# Patient Record
Sex: FEMALE | Race: WHITE
[De-identification: names, ages, dates, MRNs, and addresses within clinical notes are randomized per-mention and may not be internally consistent; named-entity substitution may affect disease eponyms.]

---

## 2020-10-07 ENCOUNTER — HOSPITAL ENCOUNTER (INPATIENT)
Dept: HOSPITAL 19 - LDRO | Age: 25
LOS: 1 days | Discharge: HOME | End: 2020-10-08
Attending: OBSTETRICS & GYNECOLOGY | Admitting: OBSTETRICS & GYNECOLOGY
Payer: COMMERCIAL

## 2020-10-07 VITALS — DIASTOLIC BLOOD PRESSURE: 80 MMHG | SYSTOLIC BLOOD PRESSURE: 124 MMHG | HEART RATE: 95 BPM | TEMPERATURE: 98.3 F

## 2020-10-07 VITALS — HEART RATE: 76 BPM | SYSTOLIC BLOOD PRESSURE: 120 MMHG | DIASTOLIC BLOOD PRESSURE: 68 MMHG

## 2020-10-07 VITALS — HEART RATE: 83 BPM | SYSTOLIC BLOOD PRESSURE: 105 MMHG | DIASTOLIC BLOOD PRESSURE: 67 MMHG | TEMPERATURE: 98 F

## 2020-10-07 VITALS — DIASTOLIC BLOOD PRESSURE: 73 MMHG | HEART RATE: 106 BPM | SYSTOLIC BLOOD PRESSURE: 115 MMHG

## 2020-10-07 VITALS — SYSTOLIC BLOOD PRESSURE: 110 MMHG | HEART RATE: 100 BPM | DIASTOLIC BLOOD PRESSURE: 71 MMHG

## 2020-10-07 VITALS — HEART RATE: 98 BPM | SYSTOLIC BLOOD PRESSURE: 122 MMHG | DIASTOLIC BLOOD PRESSURE: 57 MMHG

## 2020-10-07 VITALS — SYSTOLIC BLOOD PRESSURE: 114 MMHG | DIASTOLIC BLOOD PRESSURE: 73 MMHG | HEART RATE: 97 BPM

## 2020-10-07 VITALS — SYSTOLIC BLOOD PRESSURE: 126 MMHG | HEART RATE: 108 BPM | DIASTOLIC BLOOD PRESSURE: 60 MMHG

## 2020-10-07 VITALS — WEIGHT: 159.39 LBS | HEIGHT: 62.99 IN | BODY MASS INDEX: 28.24 KG/M2

## 2020-10-07 VITALS — HEART RATE: 93 BPM | DIASTOLIC BLOOD PRESSURE: 49 MMHG | TEMPERATURE: 98.2 F | SYSTOLIC BLOOD PRESSURE: 112 MMHG

## 2020-10-07 VITALS — SYSTOLIC BLOOD PRESSURE: 113 MMHG | DIASTOLIC BLOOD PRESSURE: 64 MMHG | HEART RATE: 108 BPM

## 2020-10-07 VITALS — HEART RATE: 72 BPM | TEMPERATURE: 98.4 F | DIASTOLIC BLOOD PRESSURE: 72 MMHG | SYSTOLIC BLOOD PRESSURE: 116 MMHG

## 2020-10-07 VITALS — HEART RATE: 106 BPM | SYSTOLIC BLOOD PRESSURE: 108 MMHG | DIASTOLIC BLOOD PRESSURE: 58 MMHG

## 2020-10-07 VITALS — SYSTOLIC BLOOD PRESSURE: 120 MMHG | DIASTOLIC BLOOD PRESSURE: 76 MMHG | HEART RATE: 114 BPM

## 2020-10-07 VITALS — SYSTOLIC BLOOD PRESSURE: 121 MMHG | DIASTOLIC BLOOD PRESSURE: 70 MMHG | HEART RATE: 102 BPM

## 2020-10-07 VITALS — DIASTOLIC BLOOD PRESSURE: 73 MMHG | HEART RATE: 95 BPM | SYSTOLIC BLOOD PRESSURE: 111 MMHG

## 2020-10-07 VITALS — SYSTOLIC BLOOD PRESSURE: 146 MMHG | HEART RATE: 123 BPM | DIASTOLIC BLOOD PRESSURE: 78 MMHG

## 2020-10-07 VITALS — HEART RATE: 113 BPM | SYSTOLIC BLOOD PRESSURE: 130 MMHG | DIASTOLIC BLOOD PRESSURE: 62 MMHG

## 2020-10-07 VITALS — HEART RATE: 89 BPM | DIASTOLIC BLOOD PRESSURE: 65 MMHG | SYSTOLIC BLOOD PRESSURE: 107 MMHG

## 2020-10-07 VITALS — DIASTOLIC BLOOD PRESSURE: 69 MMHG | HEART RATE: 106 BPM | SYSTOLIC BLOOD PRESSURE: 124 MMHG

## 2020-10-07 DIAGNOSIS — Z3A.40: ICD-10-CM

## 2020-10-07 LAB
BASOPHILS # BLD: 0 10*3/UL (ref 0–0.2)
BASOPHILS NFR BLD AUTO: 0.4 % (ref 0–2)
EOSINOPHIL # BLD: 0.1 10*3/UL (ref 0–0.7)
EOSINOPHIL NFR BLD: 0.5 % (ref 0–4)
ERYTHROCYTE [DISTWIDTH] IN BLOOD BY AUTOMATED COUNT: 11.7 % (ref 11.5–14.5)
GRANULOCYTES # BLD AUTO: 70.5 % (ref 42.2–75.2)
HCT VFR BLD AUTO: 39.2 % (ref 37–47)
HGB BLD-MCNC: 13.7 G/DL (ref 12.5–16)
LYMPHOCYTES # BLD: 2.2 10*3/UL (ref 1.2–3.4)
LYMPHOCYTES NFR BLD: 20.7 % (ref 20–51)
MCH RBC QN AUTO: 33 PG (ref 27–31)
MCHC RBC AUTO-ENTMCNC: 35 G/DL (ref 33–37)
MCV RBC AUTO: 94 FL (ref 80–100)
MONOCYTES # BLD: 0.8 10*3/UL (ref 0.1–0.6)
MONOCYTES NFR BLD AUTO: 7.4 % (ref 1.7–9.3)
NEUTROPHILS # BLD: 7.6 10*3/UL (ref 1.4–6.5)
PLATELET # BLD AUTO: 226 K/MM3 (ref 130–400)
PMV BLD AUTO: 10.7 FL (ref 7.4–10.4)
RBC # BLD AUTO: 4.18 M/MM3 (ref 4.1–5.3)

## 2020-10-07 PROCEDURE — 0KQM0ZZ REPAIR PERINEUM MUSCLE, OPEN APPROACH: ICD-10-PCS | Performed by: OBSTETRICS & GYNECOLOGY

## 2020-10-07 NOTE — NUR
Report recieved at 1845. Pt denied bedside shift report. Informed pt was going
to attempt to breastfeed during report. At 1900 introduced self to pt and
updated whiteboard. Reports going to attempt to breastfeed. VS obtaind and
assessment completed. Motrin administered per request. Denied further
questions or concerns.

## 2020-10-07 NOTE — NUR
0140 YUSUF Yancey to room to place epidural. Patient sits upright on the edge
of the bed. FHR difficult to monitor in this position and intermittently
traces maternal HR as it coorelates with maternal spO2 tracing.
0151 Single shot by YUSUF Yancey. See anesthesia record for details.

## 2020-10-07 NOTE — NUR
G1 at 40 weeks gestation to LDR5 with c/o contractions and SROM at 2230.
Patient changed into gown and wedged to left side in bed. EFMs explained and
applied.  bpm and reactive. CTX q3-5 minutes apart, patient breathing
through them. VSS. SVE 5/90/-2 with moderate amount of clear fluid noted.
Assessment completed. Plan of care reviewed with patient and spouse.

## 2020-10-07 NOTE — NUR
0325 Patient pushing with contractions. 0335 Dr. Adan on unit. Reviews FHR
tracing and pushing efforts, remains on unit. Patient continues to push with
contractions. 0350 Dr. Adan to room for delivery, patient prepped. 0359
Spontaneous vaginal delivery of viable female infant by Dr. Adan. Cord clamped
and cut and infant to the care of the nursery RN. 0407 Spontaneous delivery of
placenta by Dr. Adan. Pitocin infusing at 333ml/hr per orders and protocol.
Fundus firm, lochia WNL. Repair of 2nd degree perineal laceration and
bilateral labial lacerations by Dr. Adan.

## 2020-10-07 NOTE — NUR
Recurrent late FHR decelerations continue. 0305 SVE - complete/+2. Patient
pushes with contraction, after pushing FHR down to 60 bpm for 3 minutes with a
gradual return to baseline over the next 2 minutes. 0310 Dr. Adan called and
updated. He is on his way to the hospital.

## 2020-10-08 VITALS — SYSTOLIC BLOOD PRESSURE: 118 MMHG | HEART RATE: 91 BPM | DIASTOLIC BLOOD PRESSURE: 73 MMHG | TEMPERATURE: 98.1 F

## 2020-10-08 VITALS — DIASTOLIC BLOOD PRESSURE: 77 MMHG | SYSTOLIC BLOOD PRESSURE: 128 MMHG | TEMPERATURE: 98.4 F | HEART RATE: 85 BPM

## 2020-10-08 NOTE — NUR
Initial visit; Parents thanked  for offering congratulations and God's
blessings for the birth of their daughter.  thanked family for
choosing [facility].

## 2022-04-03 ENCOUNTER — HOSPITAL ENCOUNTER (INPATIENT)
Dept: HOSPITAL 19 - LDRO | Age: 27
LOS: 1 days | Discharge: HOME | End: 2022-04-04
Attending: OBSTETRICS & GYNECOLOGY | Admitting: OBSTETRICS & GYNECOLOGY
Payer: COMMERCIAL

## 2022-04-03 VITALS — DIASTOLIC BLOOD PRESSURE: 55 MMHG | HEART RATE: 100 BPM | SYSTOLIC BLOOD PRESSURE: 117 MMHG

## 2022-04-03 VITALS — HEART RATE: 68 BPM | SYSTOLIC BLOOD PRESSURE: 124 MMHG | TEMPERATURE: 98.4 F | DIASTOLIC BLOOD PRESSURE: 68 MMHG

## 2022-04-03 VITALS — DIASTOLIC BLOOD PRESSURE: 70 MMHG | HEART RATE: 84 BPM | SYSTOLIC BLOOD PRESSURE: 108 MMHG | TEMPERATURE: 98.2 F

## 2022-04-03 VITALS — DIASTOLIC BLOOD PRESSURE: 68 MMHG | HEART RATE: 72 BPM | SYSTOLIC BLOOD PRESSURE: 110 MMHG

## 2022-04-03 VITALS — SYSTOLIC BLOOD PRESSURE: 111 MMHG | TEMPERATURE: 98.1 F | DIASTOLIC BLOOD PRESSURE: 76 MMHG | HEART RATE: 102 BPM

## 2022-04-03 VITALS — DIASTOLIC BLOOD PRESSURE: 59 MMHG | HEART RATE: 96 BPM | SYSTOLIC BLOOD PRESSURE: 120 MMHG

## 2022-04-03 VITALS — HEART RATE: 76 BPM | DIASTOLIC BLOOD PRESSURE: 72 MMHG | SYSTOLIC BLOOD PRESSURE: 114 MMHG

## 2022-04-03 VITALS — DIASTOLIC BLOOD PRESSURE: 62 MMHG | HEART RATE: 92 BPM | TEMPERATURE: 97.7 F | SYSTOLIC BLOOD PRESSURE: 108 MMHG

## 2022-04-03 VITALS — SYSTOLIC BLOOD PRESSURE: 114 MMHG | DIASTOLIC BLOOD PRESSURE: 68 MMHG | HEART RATE: 64 BPM

## 2022-04-03 VITALS — DIASTOLIC BLOOD PRESSURE: 79 MMHG | SYSTOLIC BLOOD PRESSURE: 116 MMHG | HEART RATE: 78 BPM

## 2022-04-03 VITALS — DIASTOLIC BLOOD PRESSURE: 68 MMHG | HEART RATE: 66 BPM | SYSTOLIC BLOOD PRESSURE: 116 MMHG

## 2022-04-03 VITALS — SYSTOLIC BLOOD PRESSURE: 112 MMHG | HEART RATE: 75 BPM | DIASTOLIC BLOOD PRESSURE: 68 MMHG

## 2022-04-03 VITALS — SYSTOLIC BLOOD PRESSURE: 106 MMHG | HEART RATE: 90 BPM | DIASTOLIC BLOOD PRESSURE: 51 MMHG

## 2022-04-03 VITALS — WEIGHT: 167.33 LBS | BODY MASS INDEX: 29.65 KG/M2 | HEIGHT: 62.99 IN

## 2022-04-03 DIAGNOSIS — Z3A.39: ICD-10-CM

## 2022-04-03 LAB
BASOPHILS # BLD: 0.1 K/MM3 (ref 0–0.2)
BASOPHILS NFR BLD AUTO: 0.4 % (ref 0–2)
EOSINOPHIL # BLD: 0 K/MM3 (ref 0–0.7)
EOSINOPHIL NFR BLD: 0.3 % (ref 0–4)
ERYTHROCYTE [DISTWIDTH] IN BLOOD BY AUTOMATED COUNT: 11.9 % (ref 11.5–14.5)
GRANULOCYTES # BLD AUTO: 81.9 % (ref 42.2–75.2)
HCT VFR BLD AUTO: 37.3 % (ref 37–47)
HGB BLD-MCNC: 12.9 G/DL (ref 12.5–16)
LYMPHOCYTES # BLD: 1.5 K/MM3 (ref 1.2–3.4)
LYMPHOCYTES NFR BLD: 12.5 % (ref 20–51)
MCH RBC QN AUTO: 31 PG (ref 27–31)
MCHC RBC AUTO-ENTMCNC: 35 G/DL (ref 33–37)
MCV RBC AUTO: 89 FL (ref 80–100)
MONOCYTES # BLD: 0.6 K/MM3 (ref 0.1–0.6)
MONOCYTES NFR BLD AUTO: 4.6 % (ref 1.7–9.3)
NEUTROPHILS # BLD: 9.9 K/MM3 (ref 1.4–6.5)
PLATELET # BLD AUTO: 215 K/MM3 (ref 130–400)
PMV BLD AUTO: 10.1 FL (ref 7.4–10.4)
RBC # BLD AUTO: 4.19 M/MM3 (ref 4.1–5.3)

## 2022-04-03 NOTE — NUR
1125 PEANUT BALL  PLACED PATIENT LL AT THIS TIME.
1130 SVE COMPLETE/100/+2 DR RUBIO CALLED TO ROOM AT THIS TIME FOR DELIVERY.
PATIENT FEELING TONS OF PRESSURE.  WITH VARIABLES NOTED WITH EACH
CONTRACTION.
1132 DR RUBIO AT BEDSIDE. PREP PATIENT FOR DELIVERY. WILL PUSH WITH EACH
CONTRACTION.
1135 BABY BOY DELIVERED  WITH HAND BY HEAD AND NUCHAL TIMES ONE THAT WAS
CLAMPED AND CUT BY DR RUBIO. BABY TO MOM CHEST SKIN TO SKIN. STRONG CRY NOTED.
1137 PLACENTA DELIVERED AT THIS TIME AND PITOCIN STARTED  PER PROTOCOL.
FUNDUS FIRM AND BLEEDING WNL.  NO REPAIR DONE AT THS TIME.  BABY REMAINS SKIN
TO SKIN AT THIS TIEM.

## 2022-04-03 NOTE — NUR
1040 PATIENT HERE FOR COMPLAINTS THAT CONTRACTIONS HAVE GOT MUCH MORE INTENSE
AND FEELING PRESSURE ALSO. SVE 7/100/0 BULGY BAG NOTED. EFM ON  BABY
VERY ACTIVE AND ACCELERATIONS NOTED. DR RUBIO UPDATED AND ORDERS GIVEN.

## 2022-04-03 NOTE — NUR
1110 DR RUBIO AT BEDSIDE AROM WITH AMNIOHOOK. MECONIUM FLUID NOTED AT THIS
TIME. CONDE PLACED BY THIS NURSE.

## 2022-04-03 NOTE — NUR
1500 REPORT GIVEN TO DEANNA VASQUEZ LPN TO ASSUME CARE AT THIS TIME. LEGS REMIAN
NUMB AT THIS TIME FROM EPIDURAL SO UNABLE TO MOVE TO ROOM AT THIS TIME

## 2022-04-04 VITALS — TEMPERATURE: 97.7 F | HEART RATE: 76 BPM | SYSTOLIC BLOOD PRESSURE: 100 MMHG | DIASTOLIC BLOOD PRESSURE: 72 MMHG

## 2022-04-04 VITALS — TEMPERATURE: 98.1 F | SYSTOLIC BLOOD PRESSURE: 88 MMHG | HEART RATE: 76 BPM | DIASTOLIC BLOOD PRESSURE: 52 MMHG

## 2024-10-07 ENCOUNTER — HOSPITAL ENCOUNTER (INPATIENT)
Dept: HOSPITAL 19 - LDR | Age: 29
LOS: 1 days | Discharge: HOME | End: 2024-10-08
Attending: OBSTETRICS & GYNECOLOGY | Admitting: OBSTETRICS & GYNECOLOGY
Payer: COMMERCIAL

## 2024-10-07 VITALS — DIASTOLIC BLOOD PRESSURE: 70 MMHG | HEART RATE: 82 BPM | SYSTOLIC BLOOD PRESSURE: 114 MMHG

## 2024-10-07 VITALS — SYSTOLIC BLOOD PRESSURE: 1223 MMHG | DIASTOLIC BLOOD PRESSURE: 66 MMHG | HEART RATE: 85 BPM

## 2024-10-07 VITALS — DIASTOLIC BLOOD PRESSURE: 62 MMHG | SYSTOLIC BLOOD PRESSURE: 107 MMHG | HEART RATE: 85 BPM

## 2024-10-07 VITALS — SYSTOLIC BLOOD PRESSURE: 116 MMHG | HEART RATE: 74 BPM | DIASTOLIC BLOOD PRESSURE: 63 MMHG

## 2024-10-07 VITALS — DIASTOLIC BLOOD PRESSURE: 70 MMHG | SYSTOLIC BLOOD PRESSURE: 103 MMHG | HEART RATE: 85 BPM

## 2024-10-07 VITALS — DIASTOLIC BLOOD PRESSURE: 64 MMHG | SYSTOLIC BLOOD PRESSURE: 116 MMHG | HEART RATE: 85 BPM

## 2024-10-07 VITALS — DIASTOLIC BLOOD PRESSURE: 69 MMHG | SYSTOLIC BLOOD PRESSURE: 108 MMHG | HEART RATE: 85 BPM | TEMPERATURE: 98.2 F

## 2024-10-07 VITALS — HEART RATE: 82 BPM | SYSTOLIC BLOOD PRESSURE: 112 MMHG | DIASTOLIC BLOOD PRESSURE: 67 MMHG

## 2024-10-07 VITALS — HEART RATE: 80 BPM | DIASTOLIC BLOOD PRESSURE: 60 MMHG | SYSTOLIC BLOOD PRESSURE: 105 MMHG

## 2024-10-07 VITALS — DIASTOLIC BLOOD PRESSURE: 58 MMHG | TEMPERATURE: 98.7 F | HEART RATE: 66 BPM | SYSTOLIC BLOOD PRESSURE: 106 MMHG

## 2024-10-07 VITALS — SYSTOLIC BLOOD PRESSURE: 111 MMHG | HEART RATE: 77 BPM | DIASTOLIC BLOOD PRESSURE: 54 MMHG | TEMPERATURE: 98.1 F

## 2024-10-07 VITALS — SYSTOLIC BLOOD PRESSURE: 113 MMHG | HEART RATE: 96 BPM | DIASTOLIC BLOOD PRESSURE: 75 MMHG

## 2024-10-07 VITALS — HEART RATE: 86 BPM | SYSTOLIC BLOOD PRESSURE: 105 MMHG | DIASTOLIC BLOOD PRESSURE: 71 MMHG

## 2024-10-07 VITALS — DIASTOLIC BLOOD PRESSURE: 70 MMHG | HEART RATE: 83 BPM | SYSTOLIC BLOOD PRESSURE: 105 MMHG

## 2024-10-07 VITALS — SYSTOLIC BLOOD PRESSURE: 102 MMHG | DIASTOLIC BLOOD PRESSURE: 60 MMHG | HEART RATE: 82 BPM

## 2024-10-07 VITALS — DIASTOLIC BLOOD PRESSURE: 75 MMHG | HEART RATE: 94 BPM | SYSTOLIC BLOOD PRESSURE: 112 MMHG | TEMPERATURE: 98 F

## 2024-10-07 VITALS — SYSTOLIC BLOOD PRESSURE: 113 MMHG | DIASTOLIC BLOOD PRESSURE: 66 MMHG | HEART RATE: 82 BPM

## 2024-10-07 VITALS — SYSTOLIC BLOOD PRESSURE: 127 MMHG | HEART RATE: 87 BPM | DIASTOLIC BLOOD PRESSURE: 79 MMHG

## 2024-10-07 VITALS — TEMPERATURE: 98.2 F | SYSTOLIC BLOOD PRESSURE: 106 MMHG | DIASTOLIC BLOOD PRESSURE: 72 MMHG | HEART RATE: 103 BPM

## 2024-10-07 VITALS — HEART RATE: 83 BPM | DIASTOLIC BLOOD PRESSURE: 74 MMHG | SYSTOLIC BLOOD PRESSURE: 106 MMHG

## 2024-10-07 VITALS — SYSTOLIC BLOOD PRESSURE: 108 MMHG | DIASTOLIC BLOOD PRESSURE: 56 MMHG | HEART RATE: 69 BPM

## 2024-10-07 VITALS — HEART RATE: 75 BPM | DIASTOLIC BLOOD PRESSURE: 89 MMHG | SYSTOLIC BLOOD PRESSURE: 107 MMHG

## 2024-10-07 VITALS — HEART RATE: 97 BPM | DIASTOLIC BLOOD PRESSURE: 75 MMHG | SYSTOLIC BLOOD PRESSURE: 115 MMHG

## 2024-10-07 VITALS — SYSTOLIC BLOOD PRESSURE: 108 MMHG | DIASTOLIC BLOOD PRESSURE: 60 MMHG | HEART RATE: 71 BPM

## 2024-10-07 VITALS — HEART RATE: 79 BPM | DIASTOLIC BLOOD PRESSURE: 64 MMHG | SYSTOLIC BLOOD PRESSURE: 104 MMHG

## 2024-10-07 VITALS — HEART RATE: 61 BPM | SYSTOLIC BLOOD PRESSURE: 101 MMHG | DIASTOLIC BLOOD PRESSURE: 65 MMHG

## 2024-10-07 VITALS — BODY MASS INDEX: 30.35 KG/M2 | WEIGHT: 171.3 LBS | HEIGHT: 62.99 IN

## 2024-10-07 VITALS — HEART RATE: 94 BPM | SYSTOLIC BLOOD PRESSURE: 111 MMHG | DIASTOLIC BLOOD PRESSURE: 51 MMHG

## 2024-10-07 VITALS — HEART RATE: 87 BPM | DIASTOLIC BLOOD PRESSURE: 69 MMHG | SYSTOLIC BLOOD PRESSURE: 109 MMHG

## 2024-10-07 VITALS — HEART RATE: 100 BPM | DIASTOLIC BLOOD PRESSURE: 70 MMHG | SYSTOLIC BLOOD PRESSURE: 114 MMHG

## 2024-10-07 VITALS — SYSTOLIC BLOOD PRESSURE: 109 MMHG | DIASTOLIC BLOOD PRESSURE: 67 MMHG | HEART RATE: 78 BPM

## 2024-10-07 DIAGNOSIS — O36.5930: ICD-10-CM

## 2024-10-07 DIAGNOSIS — Z3A.38: ICD-10-CM

## 2024-10-07 LAB
BASOPHILS # BLD: 0 K/MM3 (ref 0–0.2)
BASOPHILS NFR BLD AUTO: 0.6 % (ref 0–2)
EOSINOPHIL # BLD: 0.1 K/MM3 (ref 0–0.7)
EOSINOPHIL NFR BLD: 1.1 % (ref 0–4)
ERYTHROCYTE [DISTWIDTH] IN BLOOD BY AUTOMATED COUNT: 12.3 % (ref 11.5–14.5)
GRANULOCYTES # BLD AUTO: 73.6 % (ref 42.2–75.2)
HCT VFR BLD AUTO: 35.5 % (ref 37–47)
HGB BLD-MCNC: 12.3 G/DL (ref 12.5–16)
LYMPHOCYTES # BLD: 1.2 K/MM3 (ref 1.2–3.4)
LYMPHOCYTES NFR BLD: 17.4 % (ref 20–51)
MCH RBC QN AUTO: 32 PG (ref 27–31)
MCHC RBC AUTO-ENTMCNC: 35 G/DL (ref 33–37)
MCV RBC AUTO: 92 FL (ref 80–100)
MONOCYTES # BLD: 0.5 K/MM3 (ref 0.1–0.6)
MONOCYTES NFR BLD AUTO: 6.7 % (ref 1.7–9.3)
NEUTROPHILS # BLD: 5.2 K/MM3 (ref 1.4–6.5)
PLATELET # BLD AUTO: 179 K/MM3 (ref 130–400)
PMV BLD AUTO: 9.8 FL (ref 7.4–10.4)
RBC # BLD AUTO: 3.86 M/MM3 (ref 4.1–5.3)

## 2024-10-07 PROCEDURE — 3E033VJ INTRODUCTION OF OTHER HORMONE INTO PERIPHERAL VEIN, PERCUTANEOUS APPROACH: ICD-10-PCS | Performed by: OBSTETRICS & GYNECOLOGY

## 2024-10-07 NOTE — NUR
0630PT AMBULATORY TO UNIT WITH SPOUSE AND SISTER FOR SCHEDULED INDUCTION. PT
CHANGED INTO GOWN. PT COMFORTABLE IN BED.
 
0635THIS RN AT BEDSIDE TO PLACE TOCO AND EFM. EFM TRACING CAT I. POSITIVE
ACCELS NOTED. NO DECELS NOTED. PT REPORTS POSITIVE FETAL MOVEMENT. PT
REPORTS FEELING THE OCCASIONAL CTX, BUT NOTHING REGULAR. PT DENIES LOF
AND DENIES VAGINAL BLEEDING. PT VITAL SIGNS STABLE.
 
0645THIS RN DISCUSSES POC WITH PT AND SPOUSE. PT REPORTS UNDERSTANDING AND
AGREEMENT.
 
0705IV STARTED PER THIS RN IN RIGHT WRIST.
 
0710FIRST BAG OF IVF STARTED. FIRST DOES OF PENICILLIN G STARTED.
 
0720SVE PER THIS RN. 3/90/-3. PT TOLERATED WELL.
 
0722PITOCIN STARTED AT THIS TIME.

## 2024-10-07 NOTE — NUR
1120DR BENEDICTO AT BEDSIDE. DISCUSSES POC WITH PT. PT VERBALIZES UNDERSTANDING.
 
1122SVE PER DR DIANE. 3-4/80/-2. AROM AT THIS TIME PER DR DIANE. MODERATE
AMOUNT OF CLEAR FLUID NOTED. PT TOLERATED WELL.

## 2024-10-07 NOTE — NUR
1010-1025PT AMBULATORY IN HALLS ACCOMPANIED BY SPOUSE. EFM DIFFICULT TO TRACE
DUE TO MOVEMENT.
 
1025PT STANDING AT SIDE OF BED. EFM TRACING CAT I.

## 2024-10-07 NOTE — NUR
1242PT CALLS OUT STATING SHE FEELS QUITE A BIT OF RECTAL PRESSURE.
 
1245SVE PER THIS RN. 10/100/+1. DR DIANE NOTIFIED AND ON HIS WAY. NURSERY AND
CHARGE NURSES NOTIFIED.
 
1248PITOCIN TURNED OFF. ROOM SET UP FOR DELIVERY.
 
1253DR BENEDICTO ON UNIT. NURSERY AND CHARGE NURSES AT BEDSIDE.
 
1254DR BENEDICTO AT BEDSIDE. CONDE REMOVED PER THIS RN. ROOM AND PT SET UP FOR
IMPENDING DELIVERY.
 
1256DR BENEDICTO AND THIS RN PUSHING WITH PT AT THIS TIME. GOOD MATERNAL EFFORT
NOTED.
 
1257SVD OF VIABLE MALE INFANT PER DR DIANE. INFANT PLACED ON MATERNAL
ABDOMEN. CARE ASSUMED BY NURSERY.
 
1301SVD OF PLACENTA PER DR DIANE. PERINEUM INTACT. FUNDUS FIRM AT U. LOCHIA
WNL.
 
1304BED AND ROOM PUT BACK TOGETHER. PERICARE PERFORMED. ICE PACK PLACED ON
PERINEUM. PT COMFORTABLE IN BED. FUNDUS FIRM AT U. LOCHIA WNL. PT VITAL
SIGNS STABLE.

## 2024-10-07 NOTE — NUR
1515THIS RN AT BEDSIDE. THIS RN ASSESSES PT'S LEG STATUS.
 
1520PT AMBULATORY TO BATHROOM. STEADY GAIT. STANDBY ASSIST BY THIS RN. PT
VOIDS AT THIS TIME. PT PERFORMS PERICARE. ORANGE PAD, ICE PACK, WITCH
HAZEL PADS PLACED WITH MESH UNDERWEAR. PT WASHES HANDS. RN REMOVES
EPIDURAL CATHETER. TIP INTACT. PT TOLERATED WELL.
 
1525PT AND SPOUSE AMBULATORY TO POSTPARTUM ROOM 218 WITH THIS RN. RN ORIENTS
PT TO ROOM, MENU, CALL LIGHT, AND BATHROOM. EDUCATION PACKET GIVEN AND
REVIEWED. PT VERBALIZES UNDERSTANDING.

## 2024-10-07 NOTE — NUR
1052DUANE CRNA NOTIFIED OF PT REQUEST FOR EPIDURAL.
 
1053SECOND BAG OF IVF STARTED.
 
1056DUANE CRNA AT BEDSIDE. PT SITTING ON SIDE OF BED. PT VITAL SIGNS STABLE.
PULSE OX PLACED.
 
1101TEST DOSE ADMINISTERED AT THIS TIME PER DUANE CRNA. PT TOLERATED WELL.
 
1105PT BACK TO BED. PT POSITIONED WEDGE LEFT.
 
1110PT COMFORTABLE WITH CTX.

## 2024-10-07 NOTE — NUR
1210THIS RN AT BEDSIDE. DISCUSSES POC WITH PT. PT VERBALIZES UNDERSTANDING.
 
1215FOLEY PLACED AT THIS TIME PER THIS RN. SVE PER THIS RN. 6/80/-1. PT
TOLERATED WELL. PT VITAL SIGNS STABLE. EFM TRACING CAT I, WITH RECURRENT
EARLY DECELS.

## 2024-10-08 VITALS — TEMPERATURE: 98.2 F | DIASTOLIC BLOOD PRESSURE: 60 MMHG | SYSTOLIC BLOOD PRESSURE: 96 MMHG | HEART RATE: 84 BPM

## 2024-10-08 VITALS — TEMPERATURE: 98.1 F | SYSTOLIC BLOOD PRESSURE: 95 MMHG | HEART RATE: 64 BPM | DIASTOLIC BLOOD PRESSURE: 65 MMHG

## 2024-10-08 NOTE — NUR
Initial visit; Parents thanked  for offering congratulations and God's
blessings for the birth of their son.  thanked family for choosing
[facility].